# Patient Record
Sex: FEMALE | URBAN - METROPOLITAN AREA
[De-identification: names, ages, dates, MRNs, and addresses within clinical notes are randomized per-mention and may not be internally consistent; named-entity substitution may affect disease eponyms.]

---

## 2022-03-15 ENCOUNTER — TELEPHONE ENCOUNTER (OUTPATIENT)
Dept: URBAN - METROPOLITAN AREA CLINIC 82 | Facility: CLINIC | Age: 62
End: 2022-03-15

## 2022-03-22 ENCOUNTER — DASHBOARD ENCOUNTERS (OUTPATIENT)
Age: 62
End: 2022-03-22

## 2022-03-22 ENCOUNTER — OFFICE VISIT (OUTPATIENT)
Dept: URBAN - METROPOLITAN AREA CLINIC 115 | Facility: CLINIC | Age: 62
End: 2022-03-22
Payer: COMMERCIAL

## 2022-03-22 ENCOUNTER — LAB OUTSIDE AN ENCOUNTER (OUTPATIENT)
Dept: URBAN - METROPOLITAN AREA CLINIC 115 | Facility: CLINIC | Age: 62
End: 2022-03-22

## 2022-03-22 VITALS
SYSTOLIC BLOOD PRESSURE: 145 MMHG | TEMPERATURE: 97.3 F | HEART RATE: 64 BPM | DIASTOLIC BLOOD PRESSURE: 86 MMHG | BODY MASS INDEX: 25.47 KG/M2 | HEIGHT: 62 IN | WEIGHT: 138.4 LBS | RESPIRATION RATE: 18 BRPM

## 2022-03-22 DIAGNOSIS — Z12.12 ENCOUNTER FOR SCREENING FOR MALIGNANT NEOPLASM OF RECTUM: ICD-10-CM

## 2022-03-22 DIAGNOSIS — Z12.11 ENCOUNTER FOR SCREENING FOR MALIGNANT NEOPLASM OF COLON: ICD-10-CM

## 2022-03-22 PROBLEM — 305058001: Status: ACTIVE | Noted: 2022-03-22

## 2022-03-22 PROCEDURE — 99202 OFFICE O/P NEW SF 15 MIN: CPT | Performed by: INTERNAL MEDICINE

## 2022-03-22 RX ORDER — KRILL/OM-3/DHA/EPA/PHOSPHO/AST 1000-230MG
1 TABLET CAPSULE ORAL ONCE A DAY
Status: ACTIVE | COMMUNITY

## 2022-03-22 RX ORDER — HYDROCHLOROTHIAZIDE 12.5 MG/1
1 CAPSULE IN THE MORNING CAPSULE ORAL ONCE A DAY
Status: ACTIVE | COMMUNITY

## 2022-03-22 RX ORDER — PNV NO.95/FERROUS FUM/FOLIC AC 28MG-0.8MG
AS DIRECTED TABLET ORAL
Status: ACTIVE | COMMUNITY

## 2022-03-22 RX ORDER — LISINOPRIL 40 MG/1
1 TABLET TABLET ORAL ONCE A DAY
Status: ACTIVE | COMMUNITY

## 2022-03-22 NOTE — HPI-TODAY'S VISIT:
63 y/o  female with HTN,Anxiety that came to schedule her second screening colonoscopy.No alarm symptoms or signs.Family hx of colon cancer on her grandmother after age 60. No toxic habits

## 2022-05-31 PROBLEM — 305058001: Status: ACTIVE | Noted: 2022-03-22

## 2022-06-15 ENCOUNTER — TELEPHONE ENCOUNTER (OUTPATIENT)
Dept: URBAN - METROPOLITAN AREA CLINIC 96 | Facility: CLINIC | Age: 62
End: 2022-06-15

## 2022-06-16 ENCOUNTER — OFFICE VISIT (OUTPATIENT)
Dept: URBAN - METROPOLITAN AREA SURGERY CENTER 13 | Facility: SURGERY CENTER | Age: 62
End: 2022-06-16
Payer: COMMERCIAL

## 2022-06-16 DIAGNOSIS — K63.5 BENIGN COLON POLYP: ICD-10-CM

## 2022-06-16 DIAGNOSIS — Z12.11 COLON CANCER SCREENING: ICD-10-CM

## 2022-06-16 PROCEDURE — G8907 PT DOC NO EVENTS ON DISCHARG: HCPCS | Performed by: INTERNAL MEDICINE

## 2022-06-16 PROCEDURE — 45380 COLONOSCOPY AND BIOPSY: CPT | Performed by: INTERNAL MEDICINE

## 2022-06-16 RX ORDER — KRILL/OM-3/DHA/EPA/PHOSPHO/AST 1000-230MG
1 TABLET CAPSULE ORAL ONCE A DAY
Status: ACTIVE | COMMUNITY

## 2022-06-16 RX ORDER — LISINOPRIL 40 MG/1
1 TABLET TABLET ORAL ONCE A DAY
Status: ACTIVE | COMMUNITY

## 2022-06-16 RX ORDER — PNV NO.95/FERROUS FUM/FOLIC AC 28MG-0.8MG
AS DIRECTED TABLET ORAL
Status: ACTIVE | COMMUNITY

## 2022-06-16 RX ORDER — HYDROCHLOROTHIAZIDE 12.5 MG/1
1 CAPSULE IN THE MORNING CAPSULE ORAL ONCE A DAY
Status: ACTIVE | COMMUNITY